# Patient Record
Sex: FEMALE | Race: WHITE
[De-identification: names, ages, dates, MRNs, and addresses within clinical notes are randomized per-mention and may not be internally consistent; named-entity substitution may affect disease eponyms.]

---

## 2021-12-22 ENCOUNTER — HOSPITAL ENCOUNTER (EMERGENCY)
Dept: HOSPITAL 75 - ER FS | Age: 41
Discharge: HOME | End: 2021-12-22
Payer: MEDICARE

## 2021-12-22 VITALS — SYSTOLIC BLOOD PRESSURE: 115 MMHG | DIASTOLIC BLOOD PRESSURE: 96 MMHG

## 2021-12-22 VITALS — HEIGHT: 68.98 IN | BODY MASS INDEX: 43.4 KG/M2 | WEIGHT: 293 LBS

## 2021-12-22 DIAGNOSIS — M25.462: ICD-10-CM

## 2021-12-22 DIAGNOSIS — E11.9: ICD-10-CM

## 2021-12-22 DIAGNOSIS — E66.9: ICD-10-CM

## 2021-12-22 DIAGNOSIS — M17.0: Primary | ICD-10-CM

## 2021-12-22 DIAGNOSIS — M25.461: ICD-10-CM

## 2021-12-22 DIAGNOSIS — F17.210: ICD-10-CM

## 2021-12-22 NOTE — DIAGNOSTIC IMAGING REPORT
INDICATION: Knee pain and swelling progressively worsening over

the last several months.



EXAMINATION: Bilateral knees 12/22/2021



FINDINGS:



3 views of each knee.



On the right, there is moderate medial compartment narrowing with

mild narrowing laterally. There is associated spurring. There is

moderate patellofemoral narrowing and spurring as well. There is

a moderate to large suprapatellar joint effusion. No fractures or

dislocations.



On the left, there is moderate medial joint space narrowing and

spurring with mild lateral joint compartment narrowing and

spurring. Mild patellofemoral degenerative changes also noted

with a large joint effusion. No fractures or dislocations.



IMPRESSION:

1. Bilateral tricompartmental degenerative disease.

2. Large bilateral suprapatellar effusions.



Dictated by: 



  Dictated on workstation # [id number]

## 2021-12-22 NOTE — XMS REPORT
Clinical Summary

                             Created on: 2021



Ally Murcia

External Reference #: [id number]

: 1980

Sex: Female



Demographics





                          Address                   [address] [place]  [address]

 

                          Home Phone                [phone]

 

                          Preferred Language        English

 

                          Marital Status            

 

                          Zoroastrian Affiliation     [id number]

 

                          Race                      White

 

                          Ethnic Group              Not  or 





Author





                          Author                    [facility]

 

                          Organization              [facility]

 

                          Address                   Unknown

 

                          Phone                     Unavailable







Support





                Name            Relationship    Address         Phone

 

                [place]  [address] [phone]

3

 

                Per Pt. None    ECON            Unknown         [phone]

 

                Per Pt. None    ECON            Unknown         [phone]







Care Team Providers





                    Care Team Member Name Role                Phone

 

                    MauWarren spann  PCP                 [phone]

 

                    Enrrique Gates MD   Unavailable         [phone]







Source Comments

Some departments are not documenting in the electronic medical record.  If you d
o not see the information that you expected, contact Release of Information in [facility] department at [phone] for further assistan
ce in locating additional records.[facility]



Allergies





                                        Comments



                 Active Allergy  Reactions       Severity        Noted Date 

 

                                        



Causes tongue to swell



                 Penicillins     SWELLING        High            10/21/2009 







Medications





                          End Date                  Status



              Medication   Sig          Dispensed    Refills      Start  



                                         Date  

 

                                                    Active



                     amlodipine-benazepril  Take 1 Cap by       0   



                           (LOTREL) 10-20 mg per     mouth Daily     



                           capsule                   With     



                                         Breakfast.     

 

                                                    Active



                     sitaGLIPtin (JANUVIA) 100  Take 100 mg         0   



                           mg Tab tablet             by mouth     



                                         daily.     

 

                                                    Active



                     lisinopril/hydrochlorothi  Take  by            0   



                           azide (ZESTORETIC)        mouth daily.     



                                         20/12.5 mg tablet      

 

                                                    Active



                     metformin-ER(+)     Take 1,000 mg       0   



                           (FORTAMET) 1,000 mg       by mouth     



                           tablet                    twice daily.     

 

                                                    Active



                     propranolol (INDERAL) 20  Take 20 mg by       0   



                           mg tablet                 mouth twice     



                                         daily.     

 

                                                    Active



              HYDROcodone/acetaminophen  Take 1-2 Tabs  40 Tab       0          

  10/16/201  



                     (NORCO; VICODIN) 5-325 mg  by mouth            3  



                           tablet                    every 4 hours     



                                         as needed for     



                                         Pain (Must     



                                         last 14     



                                         days). Max 8     



                                         tabs/day     

 

                                                    Active



              traMADol (ULTRAM) 50 mg  Take 1 Tab by  20 Tab       0            

  



                     tablet              mouth every         4  



                                         6-8 hours as     



                                         needed for     



                                         Pain.     







Active Problems





 



                           Problem                   Noted Date

 

 



                           Postop check              2013

 

 



                                         Overview:



                                         Formatting of this note might be differ

ent from the original.



                                         13

 

 



                           Metatarsalgia             2013

 

 



                           Exostosis                 2013







Surgical History





   



                 Surgery         Date            Site/Laterality  Comments

 

   



                                         HX TUBAL LIGATION   

 

   



                                         HX FOOT SURGERY   

 

   



                                         HX FUSION PROCEDURE   







Medical History





  



                     Medical History     Date                Comments

 

  



                                         HTN (hypertension)  

 

  



                                         Depression  

 

  



                                         DM (diabetes mellitus) (HCC)  

 

  



                                         Back pain  

 

  



                                         Arthritis  

 

  



                                         Fracture  

 

  



                                         Osteoarthritis  

 

  



                                         Rheumatoid arthritis(714.0)  







Social History





                                        Date



                 Tobacco Use     Types           Packs/Day       Years Used 

 

                                         



                 Current Every Day Smoker  Cigarettes      0.5             10 

 

    



                                         Smokeless Tobacco: Never   



                                         Used   







                                        Comments



                           Alcohol Use               Standard Drinks/Week 

 

                                         



                           No                        0 (1 standard drink = 0.6 o

z pure alcohol) 







 



                           Sex Assigned at Birth     Date Recorded

 

 



                                         Not on file 







Last Filed Vital Signs





                    Reading             Time Taken          Comments



                                         Vital Sign   

 

                    138/86              2013  1:17 PM CDT  



                                         Blood Pressure   

 

                    94                  2013  1:17 PM CDT  



                                         Pulse   

 

                    36.5 C (97.7 F) 2013 12:09 PM CDT  



                                         Temperature   

 

                    -                   -                    



                                         Respiratory Rate   

 

                    95%                 2013  1:45 PM CDT  



                                         Oxygen Saturation   

 

                    -                   -                    



                                         Inhaled Oxygen   



                                         Concentration   

 

                    113.4 kg (250 lb)   2013  1:17 PM CDT  



                                         Weight   

 

                    175.3 cm (5' 9")    2013  1:17 PM CDT  



                                         Height   

 

                    36.92               2013  1:17 PM CDT  



                                         Body Mass Index   







Plan of Treatment





   



                 Health Maintenance  Due Date        Last Done       Comments

 

   



                           MEDICARE ANNUAL WELLNESS  1980  



                                         VISIT   

 

   



                           HIV SCREENING             1995  

 

   



                           DTAP/TDAP VACCINES (1 -   1998  



                                         Tdap)   

 

   



                           HEPATITIS C SCREENING     1998  

 

   



                           PHYSICAL (COMPREHENSIVE)  1998  



                                         EXAM   

 

   



                           CERVICAL CANCER SCREENING  2001  

 

   



                           BREAST CANCER SCREENING   2020  

 

   



                           INFLUENZA VACCINE         2021  







Results

Not on filefrom Last 3 Months



Insurance





                                        Type



            Payer      Benefit    Subscriber ID  Effective  Phone      Address 



                           Plan /                    Dates   



                                         Group     

 

                                        Medicare



            MEDICARE   MEDICARE   [id number]  2008-P  [phone]  [address] 



                     [address]              [address] 



                           [address]                         [place], [place] 



                                         [address] 

 

                                        [place]icaid



            [place]         [id number]   10/28/2013  [phone]  [address] 



                     MEDICAID            -Present            [address] 



                                         [place], [place] 



                                         [address] 







     



            Guarantor Name  Account    Relation to  Date of    Phone      Sona silver Address



                     Type                Patient             Birth  

 

     



            Ally Murcia  Personal/F  Self       1980  [phone]  [phone]               (Home)              [place] [address]

18







Advance Directives





                          Patient Representative    Explanation



                           Type                      Date Recorded  

 

                                                     



                           Advance                   10/28/2013 11:39 PM  



                                         Directive/DPOA   







Care Teams





                          Start Date                End Date



                     Team Member         Relationship        Specialty  

 

                          13                    



                           Warren Sutherland DO   PCP - General   



                                         [address] [place] [address]    



                                         [phone] (Work)    



                                         [phone] (Fax)    

 

                          9/3/13                     



                           Enrrique Gates MD        Surgery,  



                           [address] [place] [address] [phone] (Work)    



                                         [phone] (Fax)

## 2021-12-22 NOTE — ED LOWER EXTREMITY
General


Chief Complaint:  Lower Extremity


Stated Complaint:  BOTH KNEES SWELLING/PAIN


Nursing Triage Note:  


Patient presents to the ED with c/o bilateral knee swelling and pain. She 


reports that her symptoms began 3 months ago and she has been unable to get an 


appointment with an orthopedic specialist or her regular healthcare provider.


Source:  patient





History of Present Illness


Date Seen by Provider:  Dec 22, 2021


Time Seen by Provider:  17:34


Initial Comments


41-year-old obese female presenting with complaints of bilateral knee pain and 

swelling.  She states that initially her left knee started having pain and 

swelling over 3 months ago and then in the last month her right knee has also 

started doing this.  She called today to see about getting in with Dr. Spears and 

does not have an appointment until .  She had been seen at [facility] for evaluation of this and was evaluated by x-ray.  She was told that 

she had tricompartmental arthritis and advanced osteoarthritis.  She has had 

some relief in the past with Voltaren gel.  She has been trying ibuprofen and 

Tylenol with little improvement.  She presented here from [place],


Onset:  other (More than 3 months)


Pain/Injury Location:  bilateral knee


Method of Injury:  unknown


Modifying Factors:  Improves With Cold Therapy; Worse With Movement





Allergies and Home Medications


Allergies


Uncoded Allergies:  


     NKDA (Allergy, Mild, 09)





Patient Home Medication List


Home Medication List Reviewed:  Yes


Amlodipine Besylate/Benazepril (Lotrel 10-20 Mg Capsule) 1 Each Capsule, 

(Reported)


   Entered as Reported by: CARA ALBA on 09


Diclofenac Potassium (Diclofenac Potassium) 50 Mg Tablet, 50 MG PO TID PRN for 

pain


   Prescribed by: DEIDRE MARCUS on 21


Diclofenac Sodium (Voltaren Arthritis Pain) 20 Gm Gel..gram., 4 GM TP QID


   Prescribed by: DEIDRE MARCUS on 21


Hydrocodone Bit/Acetaminophen (Lortab  7.5 Mg) 1 Ea Tablet, 1 EA PO BID


   Prescribed by: HARINDER BEARD on 09 0053


Hydrocodone/Acetaminophen (Hydrocodone-Acetamin 5-325 mg) 1 Each Tablet, 1 TAB P

O HS PRN for PAIN-SEVERE (8-10)


   Prescribed by: DEIDRE MARCUS on 21





Review of Systems


Constitutional:  No chills, No fever


EENTM:  no symptoms reported


Respiratory:  no symptoms reported


Cardiovascular:  no symptoms reported


Gastrointestinal:  no symptoms reported


Genitourinary:  no symptoms reported


Musculoskeletal:  joint pain (Pain and swelling to bilateral knees left greater 

than right)


Skin:  lesions


Psychiatric/Neurological:  Headache





Past Medical-Social-Family Hx


Patient Social History


Tobacco Use?:  Yes


Tobacco type used:  Cigarettes


Smoking Status:  Current Everyday Smoker


Use of E-Cig and/or Vaping dev:  No


Substance use?:  No


Alcohol Use?:  No


Pt feels they are or have been:  No





Immunizations Up To Date


First/Initial COVID19 Vaccinat:  Not currently vaccinated





Past Medical History


Surgery/Hospitalization HX:  


DM; Neuropathy, irregular heart beat; depression; high cholesterol; restless leg




syndrome.





Physical Exam


Vital Signs





Vital Signs - First Documented








 21





 16:21


 


Temp 36.8


 


Pulse 119


 


Resp 20


 


B/P (MAP) 115/96 (102)


 


Pulse Ox 96


 


O2 Delivery Room Air





Capillary Refill : Less Than 3 Seconds


Height, Weight, BMI


Height: '"


Weight: lbs. oz. kg; 45.00 BMI


Method:


General Appearance:  WD/WN, obese


HEENT:  PERRL/EOMI, pharynx normal


Neck:  non-tender, full range of motion, supple, normal inspection


Cardiovascular:  normal peripheral pulses, regular rate, rhythm


Respiratory:  chest non-tender, lungs clear, normal breath sounds, no 

respiratory distress, no accessory muscle use


Gastrointestinal:  normal bowel sounds, non tender, soft, no pulsatile mass


Back:  no CVA tenderness


Knees:  bilateral knee joint effusion, bilateral knee soft tissue tenderness, 

bilateral knee swelling


Neurologic/Tendon:  normal sensation, normal motor functions, normal tendon 

functions


Neurologic/Psychiatric:  CNs II-XII nml as tested, no motor/sensory deficits, 

alert, oriented x 3





Progress/Results/Core Measures


Results/Orders


My Orders





Orders - DEIDRE MARCUS MD


Ketorolac Injection (Toradol Injection) (21 17:44)


Orphenadrine Inj (Ed Only) (Norflex Inje (21 17:44)


Knee 3 View Bilateral (21 17:44)


Ace Bandage (21 18:27)





Vital Signs/I&O











 21





 16:21 18:44


 


Temp 36.8 36.8


 


Pulse 119 108


 


Resp 20 20


 


B/P (MAP) 115/96 (102) 115/96


 


Pulse Ox 96 96


 


O2 Delivery Room Air Room Air














Blood Pressure Mean:                    102











Progress


Progress Note #1:  


Progress Note


Ordered x-rays of the bilateral knees to evaluate for arthritis or acute bony 

abnormality to account for pain, ordered Toradol and Norflex to help with her 

pain.


Progress Note #2:  


Progress Note


On her x-rays she had advanced tricompartmental arthritis with spurring.  She 

has prepatellar effusions bilaterally.  Counseled patient on findings and result

s.  Apply 6 inch roll of Ace bandage to bilateral knees.  Prescribed Voltaren 

gel to help with inflammation and pain.  Prescribed Voltaren for oral 

medication.  Stressed importance of keeping appointment with Dr. Spears in gating 

with orthopedics.  In the meantime will prescribe some pain medicine to help her

get rest at night.





Diagnostic Imaging





   Diagonstic Imaging:  Xray


   Plain Films/CT/US/NM/MRI:  knee


Comments


                 [facility] [place], [place]





NAME:   CARISSA MORENO


[facility] REC#:   [id number]


ACCOUNT#:   [id number]


PT STATUS:   REG ER


:   1980


PHYSICIAN:   DEIDRE MARCUS MD


ADMIT DATE:   21/ER FS


                                  ***Signed***


Date of Exam:21





KNEE 3 VIEW BILATERAL








INDICATION: Knee pain and swelling progressively worsening over


the last several months.





EXAMINATION: Bilateral knees 2021





FINDINGS:





3 views of each knee.





On the right, there is moderate medial compartment narrowing with


mild narrowing laterally. There is associated spurring. There is


moderate patellofemoral narrowing and spurring as well. There is


a moderate to large suprapatellar joint effusion. No fractures or


dislocations.





On the left, there is moderate medial joint space narrowing and


spurring with mild lateral joint compartment narrowing and


spurring. Mild patellofemoral degenerative changes also noted


with a large joint effusion. No fractures or dislocations.





IMPRESSION:


1. Bilateral tricompartmental degenerative disease.


2. Large bilateral suprapatellar effusions.





Dictated by: 





  Dictated on workstation # [id number]








Dict:   21 [id number]


Trans:   21 [id number]


Formerly [facility], later [facility] [id number]





Interpreted by:     LAYLA AQUINO MD


Electronically signed by: LAYLA AQUINO MD [id number]


   Reviewed:  Reviewed by Me





Departure


Impression





   Primary Impression:  


   Osteoarthritis of knees, bilateral


   Qualified Codes:  M17.0 - Bilateral primary osteoarthritis of knee


   Additional Impression:  


   Suprapatellar effusion of knee


Disposition:   HOME, SELF-CARE


Condition:  Stable





Departure-Patient Inst.


Decision time for Depature:  18:27


Referrals:  


JARETH SPEARS MD





NO,LOCAL PHYSICIAN (PCP)


Primary Care Physician


Patient Instructions:  Knee Pain ED, Swollen Joints (DC)





Add. Discharge Instructions:  


Use ace bandage for compression and support.





ice and rest your knees to help limit swelling and pain.





Continue with Voltaren gel to help with inflammation and pain.


Use the anti-inflammatory medicine by mouth to help with pain.


You could still take Tylenol or Acetaminophen with this to help with pain.





For Severe pain you could use the hydrocodone and make sure you keep appointment

with Dr. Spears next week


Ultimately you may need MRI of your knees or to see Orthopedics for more 

definitive care of your knees and arthritis





All discharge instructions reviewed with patient and/or family. Voiced 

understanding.


Scripts


Hydrocodone/Acetaminophen (Hydrocodone-Acetamin 5-325 mg) 1 Each Tablet


1 TAB PO HS PRN for PAIN-SEVERE (8-10) for 7 Days, #7 TAB 0 Refills


   Prov: DEIDRE MARCUS MD         21 


Diclofenac Potassium (Diclofenac Potassium) 50 Mg Tablet


50 MG PO TID PRN for pain for 10 Days, #30 TAB 0 Refills


   Prov: DEIDRE MARCUS MD         21 


Diclofenac Sodium (Voltaren Arthritis Pain) 20 Gm Gel..gram.


4 GM TP QID for knee pain for 10 Days, #300 GM 0 Refills


   Apply 4 grams to each knee up to 4 times a day for knee pain/swelling


   Prov: DEIDRE MARCUS MD         21











DEIDRE MARCUS MD               Dec 22, 2021 17:35

## 2022-03-03 ENCOUNTER — HOSPITAL ENCOUNTER (OUTPATIENT)
Dept: HOSPITAL 75 - ORTHO | Age: 42
End: 2022-03-03
Attending: ORTHOPAEDIC SURGERY
Payer: MEDICARE

## 2022-03-03 DIAGNOSIS — M17.0: Primary | ICD-10-CM

## 2022-06-02 ENCOUNTER — HOSPITAL ENCOUNTER (OUTPATIENT)
Dept: HOSPITAL 75 - ORTHO | Age: 42
End: 2022-06-02
Attending: ORTHOPAEDIC SURGERY
Payer: MEDICARE

## 2022-06-02 DIAGNOSIS — M17.0: Primary | ICD-10-CM

## 2022-12-27 ENCOUNTER — HOSPITAL ENCOUNTER (OUTPATIENT)
Dept: HOSPITAL 75 - ORTHO | Age: 42
End: 2022-12-27
Attending: ORTHOPAEDIC SURGERY
Payer: MEDICARE

## 2022-12-27 DIAGNOSIS — I10: ICD-10-CM

## 2022-12-27 DIAGNOSIS — M17.0: Primary | ICD-10-CM

## 2022-12-27 DIAGNOSIS — E11.9: ICD-10-CM
